# Patient Record
Sex: FEMALE | Race: WHITE | NOT HISPANIC OR LATINO | URBAN - METROPOLITAN AREA
[De-identification: names, ages, dates, MRNs, and addresses within clinical notes are randomized per-mention and may not be internally consistent; named-entity substitution may affect disease eponyms.]

---

## 2017-06-21 ENCOUNTER — TELEPHONE (OUTPATIENT)
Dept: TRANSPLANT | Facility: CLINIC | Age: 22
End: 2017-06-21

## 2017-06-21 NOTE — TELEPHONE ENCOUNTER
"Living Kidney Donor Evaluation Completed: 2017 16:31:58 CT Updated: 2017 10:44:20 CT  Donor Name: Holly Arevalo MRN: Note: : 1995 Age: 22Gender: Female Donor Height: 5  4\" Weight (lb): 147 BMI: 25.2  Donor Race:  Ethnicity: Not / Donor Preferred Language: English  Required?: No Current Marital Status: Single  Demographics: Home Address: [address] [place] [address] City: [place] State: [place] Zip: [address] Country: [place]  Best Phone: [phone] Alt Phone: Donor Email: Best Phone Type: Mobile Alt Phone Type:   Preferred Contact Time(s): 09:00 AM-11:00 AM, 11:00 AM-1:00 PM, 1:00 PM-4:00 PM Preferred Contact Day(s): Mon, Tue, Wed  Donor Screen: PASSED Donor Referred by: Friend or Family of [place] Candidate Donor self reported ABO: O  Recipient Information: Recipient Name (Last, First): Enoc Stevens Recipient :    2005  ... Donor Relationship: Met through social media Recipient Diagnosis: Recipient ABO:   MEDICAL HISTORY:  \"Chiari Malformation\"  Bursitis/Tendonitis NOS  Dysmenorrhea  Dysphagia NOS  Headache (Non-Migraine)  Joint Pain/Arthritis NOS  Migraine Headaches  Premenstrual Syndrome (PMS)  Social Phobia NOS  other (\"Headaches\")  MEDICATIONS:  Aleve  Excedrin Migraine  Ibuprofen  Midol  Propranolol  SURGICAL HISTORY:  Tonsillectomy and/or Adenoidectomy  ALLERGIES:  Pediazole : Rash  Penicillin : Wheezing and/or Bronchospasm  Zithromax : Rash  SOCIAL HISTORY:  EtOH: Occasional (1-2 drinks/week)  Illicit Drug Use: Denies  Tobacco: Denies  SELF-REPORTED FUNCTIONAL STATUS:  \"I am able to participate in moderate recreational activities like golf, double tennis, dancing, throwing a baseball or football\"  Exercise (>3X per week)  REVIEW OF ORGAN SYSTEMS: Airway or Lungs: No Blood Disorder: No Cancer: No Diabetes,Thyroid,Adrenal,Endocrine Disorder: No Digestive or Liver: Yes Female Health: No Heart or Circulatory System: No Immune Diseases: No Kidneys " and Bladder: No Muscles,Bones,Joints: Yes Neuro: Yes Psych: Yes  FAMILY HISTORY: Confirmed:  Cancer (Aunt or Uncle, Grandparent)  Hypertension (Father)  Denied:  Diabetes (denies)  Heart Disease (denies)  Kidney Disease (denies)  Kidney Stones (denies)  DONOR INFORMATION:  Level of Education: Associate's or technical degree complete Employment Status: Part Time 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 (NO)  Commercial sex < 12 months. (NO)  Illicit IV drug use < 5yrs. (NO)  Other high risk sexual contact < 12 months. (NO)  EMERGENCY CONTACT INFORMATION:  Primary Secondary First Name: Last Name: Phone Number: Relationship:   First Name: Taryn  Last Name: Irina Phone Number: [phone] Relationship: Mother  REASON FOR DONATION:   Because that's what Bar would do.   PHYSICIAN CONTACT INFORMATION:  PCP  Name:    City: State:   Phone:   ADDITIONAL NOTES:

## 2017-06-21 NOTE — TELEPHONE ENCOUNTER
Left message asking Holly to return call for screening. Age=22. Responder. Hx Chiari Malformation so mayda to see if she has a shunt.Takes Ibuprofen and Propranolol so need to determione what Propranolol is taken for. Lg response for this recip. No pkt sent.

## 2017-06-27 ENCOUNTER — TELEPHONE (OUTPATIENT)
Dept: TRANSPLANT | Facility: CLINIC | Age: 22
End: 2017-06-27

## 2017-06-27 NOTE — TELEPHONE ENCOUNTER
Still interested. Chiari Malformation was minor and doesn't require a shunt. Takes Propranolol for migraines. Will now send all Phase 1 orders with donor pkt and swabs to [facility].